# Patient Record
Sex: MALE | Race: WHITE | NOT HISPANIC OR LATINO | Employment: OTHER | URBAN - METROPOLITAN AREA
[De-identification: names, ages, dates, MRNs, and addresses within clinical notes are randomized per-mention and may not be internally consistent; named-entity substitution may affect disease eponyms.]

---

## 2020-02-13 ENCOUNTER — OFFICE VISIT (OUTPATIENT)
Dept: UROLOGY | Facility: CLINIC | Age: 65
End: 2020-02-13
Payer: COMMERCIAL

## 2020-02-13 VITALS
DIASTOLIC BLOOD PRESSURE: 73 MMHG | HEART RATE: 78 BPM | WEIGHT: 125 LBS | HEIGHT: 65 IN | BODY MASS INDEX: 20.83 KG/M2 | RESPIRATION RATE: 18 BRPM | SYSTOLIC BLOOD PRESSURE: 128 MMHG

## 2020-02-13 DIAGNOSIS — Z80.42 FAMILY HISTORY OF PROSTATE CANCER IN FATHER: ICD-10-CM

## 2020-02-13 DIAGNOSIS — N40.1 BPH WITH OBSTRUCTION/LOWER URINARY TRACT SYMPTOMS: Primary | ICD-10-CM

## 2020-02-13 DIAGNOSIS — N13.8 BPH WITH OBSTRUCTION/LOWER URINARY TRACT SYMPTOMS: Primary | ICD-10-CM

## 2020-02-13 PROCEDURE — 99203 PR OFFICE/OUTPT VISIT, NEW, LEVL III, 30-44 MIN: ICD-10-PCS | Mod: 25,S$GLB,, | Performed by: NURSE PRACTITIONER

## 2020-02-13 PROCEDURE — 51798 PR MEAS,POST-VOID RES,US,NON-IMAGING: ICD-10-PCS | Mod: S$GLB,,, | Performed by: NURSE PRACTITIONER

## 2020-02-13 PROCEDURE — 51798 US URINE CAPACITY MEASURE: CPT | Mod: S$GLB,,, | Performed by: NURSE PRACTITIONER

## 2020-02-13 PROCEDURE — 99203 OFFICE O/P NEW LOW 30 MIN: CPT | Mod: 25,S$GLB,, | Performed by: NURSE PRACTITIONER

## 2020-02-13 RX ORDER — FINASTERIDE 5 MG/1
5 TABLET, FILM COATED ORAL DAILY
COMMUNITY

## 2020-02-13 RX ORDER — TAMSULOSIN HYDROCHLORIDE 0.4 MG/1
0.4 CAPSULE ORAL DAILY
COMMUNITY

## 2020-02-13 RX ORDER — ATENOLOL 25 MG/1
25 TABLET ORAL DAILY
COMMUNITY

## 2020-02-13 RX ORDER — AMOXICILLIN 500 MG
CAPSULE ORAL DAILY
COMMUNITY

## 2020-02-13 NOTE — PROGRESS NOTES
Chief Complaint:   Chief Complaint   Patient presents with    Other     2 years since PSA checkup with urology       HPI:  64-year-old  male who presents as a new patient for establishment of care.  [facility], has not seen a urologist in 2 years.  He has a history of BPH with lower urinary tract symptoms, maintained on Flomax and Proscar which he is tolerating well without any side effects.  His symptoms are not too bothersome to him.  He has a family history of prostate cancer in his father that was diagnosed around age 70.  Patient reports a personal history of elevated PSA with a negative biopsy performed, is unsure of the PSA result or the year in which procedure was performed, was previously followed by another urologist Dr. Hawley in [place], [place].     [facility]:  9/21/15 1.49  3/14/16 1.30  11/7/16 0.93  8/14/17 1.20  3/12/18 1.62  2/22/19 1.8  12/19/19 1.59    Patient denies any other urological complaints such as burning with urination or hematuria.    Allergies:  Patient has no known allergies.    Medications:  has a current medication list which includes the following prescription(s): atenolol, finasteride, multivitamin, fish oil-omega-3 fatty acids, and tamsulosin.    Review of Systems:  General: No fever, chills, vision changes, dizziness, weakness, fatigue, unexplained weight loss, confusion, or mood swings.  Skin: No rashes, itching, or changes in color/texture of skin.  Chest: Denies MURILLO, cyanosis, wheezing, cough, and sputum production.  Abdomen: Appetite fine. Denies diarrhea, abdominal pain, hematemesis, or blood in stool.  Musculoskeletal: No joint stiffness or swelling. Denies back pain.  : As above.  All other review of systems negative.    PMH:   has a past medical history of Elevated PSA and Hypertension.    PSH:   has a past surgical history that includes External ear surgery and Cyst Removal.    FamHx: family history includes Cancer in  his mother; Diabetes in his brother and mother; Prostate cancer in his father.    SocHx:  reports that he has never smoked. He has never used smokeless tobacco. He reports that he does not drink alcohol or use drugs.      Physical Exam:  Vitals:    02/13/20 1549   BP: 128/73   Pulse: 78   Resp: 18     General: AAOx3, no apparent distress, no deformities  Neck: supple, no masses, normal thyroid  Lungs: normal inspiration, no use of accessory muscles  Heart: regular rate and rhythm, no arrhythmias  Abdomen: soft, NT, ND, no masses, no hernias, no hepatosplenomegaly  Lymphatic: no unusually enlarged or tender lymph nodes  Skin: warm and dry, no jaundice.  Ext: without edema or deformity.  : Normal rectal tone, external hemorrhoids. Prostate soft, smooth surface, no nodules or masses appreciated.    Labs/Studies: [facility] 1.59[facility]    Impression/Plan:   BPH with obstruction/lower urinary tract symptoms  Comments:  maintained on Proscar/Flomax, no bothersome symptoms, bladder scan 0mL, KODAK accomplished and benign, recent PSA 1.59 c calc value of 3.18  Orders:  -     POCT Bladder Scan    Family history of prostate cancer in father  Comments:  diagnosed at age 70s        Follow up in about 6 months (around 8/13/2020).

## 2020-02-13 NOTE — LETTER
February 13, 2020      Iglesia Turner MD  [address] [place] [address]           [facility]  [address] [place] [address]  Phone: [phone]  Fax: [phone]          Patient: Des Akhtar   MR Number: [id number]   YOB: 1955   Date of Visit: 2/13/2020       Dear Dr. Iglesia Turner:    Thank you for referring Des Akhtar to me for evaluation. Attached you will find relevant portions of my assessment and plan of care.    If you have questions, please do not hesitate to call me. I look forward to following Des Akhtar along with you.    Sincerely,    Sahra Wilson NP    Enclosure  CC:  No Recipients    If you would like to receive this communication electronically, please contact [email or web address] or [phone] to request more information on [employer] Link access.    For providers and/or their staff who would like to refer a patient to [facility], please contact us through our one-stop-shop provider referral line, [facility], at [phone].    If you feel you have received this communication in error or would no longer like to receive these types of communications, please e-mail [email or web address]

## 2020-08-05 ENCOUNTER — OFFICE VISIT (OUTPATIENT)
Dept: UROLOGY | Facility: CLINIC | Age: 65
End: 2020-08-05
Payer: COMMERCIAL

## 2020-08-05 VITALS
WEIGHT: 125 LBS | BODY MASS INDEX: 20.83 KG/M2 | RESPIRATION RATE: 19 BRPM | HEART RATE: 68 BPM | HEIGHT: 65 IN | DIASTOLIC BLOOD PRESSURE: 62 MMHG | SYSTOLIC BLOOD PRESSURE: 132 MMHG

## 2020-08-05 DIAGNOSIS — N13.8 BPH WITH OBSTRUCTION/LOWER URINARY TRACT SYMPTOMS: Primary | ICD-10-CM

## 2020-08-05 DIAGNOSIS — Z80.42 FAMILY HISTORY OF PROSTATE CANCER IN FATHER: ICD-10-CM

## 2020-08-05 DIAGNOSIS — R97.20 ELEVATED PSA: ICD-10-CM

## 2020-08-05 DIAGNOSIS — N40.1 BPH WITH OBSTRUCTION/LOWER URINARY TRACT SYMPTOMS: Primary | ICD-10-CM

## 2020-08-05 PROCEDURE — 99213 OFFICE O/P EST LOW 20 MIN: CPT | Mod: S$GLB,,, | Performed by: NURSE PRACTITIONER

## 2020-08-05 PROCEDURE — 51798 PR MEAS,POST-VOID RES,US,NON-IMAGING: ICD-10-PCS | Mod: S$GLB,,, | Performed by: NURSE PRACTITIONER

## 2020-08-05 PROCEDURE — 99213 PR OFFICE/OUTPT VISIT, EST, LEVL III, 20-29 MIN: ICD-10-PCS | Mod: S$GLB,,, | Performed by: NURSE PRACTITIONER

## 2020-08-05 PROCEDURE — 51798 US URINE CAPACITY MEASURE: CPT | Mod: S$GLB,,, | Performed by: NURSE PRACTITIONER

## 2020-08-05 NOTE — PROGRESS NOTES
Chief Complaint:   Chief Complaint   Patient presents with    Follow-up     6 mth f/u       HPI:   64-year-old  male who presents for 6 month follow up BPH with LUTS. He has a history of BPH with lower urinary tract symptoms, maintained on tamsulosin and finasteride for quite some time.  His symptoms are not too bothersome to him.  He voids to completion and empties his bladder well.  He denies any side effects from tamsulosin or finasteride, read an article about the benefits of saw palmetto but is going to stay on his same medication regimen at this time.    He has a family history of prostate cancer in his father that was diagnosed around age 70.  He reports a personal history of elevated PSA with a negative biopsy performed, is unsure of the PSA result or the year in which procedure was performed, was previously followed by another urologist Dr. Hawley in [place], [place]. (I did fax over a records request today for previous results).  He will be due for KODAK in February 2021.  Last KODAK was benign.    [facility]:  9/21/15 1.49  3/14/16 1.30  11/7/16 0.93  8/14/17 1.20  3/12/18 1.62  2/22/19 1.8  12/19/19 1.59        Calculated value of 3.18    He denies any other urological complaints such as burning with urination, hematuria, urgency, frequency, nocturia, incontinence, fever, or chills..    Allergies:  Patient has no known allergies.    Medications:  has a current medication list which includes the following prescription(s): atenolol, finasteride, multivitamin, fish oil-omega-3 fatty acids, and tamsulosin.    Review of Systems:  General: No fever, chills, vision changes, dizziness, weakness, fatigue, unexplained weight loss, confusion, or mood swings.  Skin: No rashes, itching, or changes in color/texture of skin.  Chest: Denies MURILLO, cyanosis, wheezing, cough, and sputum production.  Abdomen: Appetite fine. Denies diarrhea, abdominal pain, hematemesis, or blood in  stool.  Musculoskeletal: No joint stiffness or swelling. No painful lymph nodes.  : reviewed and negative except as stated above in the HPI.  All other review of systems negative.    PMH:   has a past medical history of BPH with obstruction/lower urinary tract symptoms, Elevated PSA, and Hypertension.    PSH:   has a past surgical history that includes External ear surgery; Cyst Removal; and Biopsy with transrectal ultrasound (TRUS) guidance.    FamHx: family history includes Cancer in his mother; Diabetes in his brother and mother; Prostate cancer in his father.    SocHx:  reports that he has never smoked. He has never used smokeless tobacco. He reports that he does not drink alcohol or use drugs.      Physical Exam:  Vitals:    08/05/20 1055   BP: 132/62   Pulse: 68   Resp: 19     General: AAOx3, no apparent distress, no deformities  Neck: supple, no masses, normal thyroid, full ROM  Lungs: CTAB, no adventitious breath sounds, normal inspiration, no use of accessory muscles  Heart: regular rate and rhythm, no arrhythmias  Abdomen: soft, NT, ND, no masses, no hernias, no hepatosplenomegaly  Lymphatic: no unusually enlarged or tender lymph nodes  Skin: warm and dry, no jaundice, no rash  Ext: without edema or deformity, DIAZ, ambulates independently  : deferred    Labs/Studies: bladder scan PV 0mL    Impression/Plan:   BPH with obstruction/lower urinary tract symptoms  Comments:  stable, mild LUTS, maintained on tamsulosin and finasteride- denies need for refill, bladder scan PV 0mL,   Orders:  -     POCT Bladder Scan    Elevated PSA  Comments:  history of neg TRUS biopsy in past by another provider, last PSA stable, continue to monitor    Family history of prostate cancer in father        Follow up in about 6 months (around 2/5/2021) for KODAK/ bring copy of PSA from PCP to next appt.

## 2021-02-05 ENCOUNTER — OFFICE VISIT (OUTPATIENT)
Dept: UROLOGY | Facility: CLINIC | Age: 66
End: 2021-02-05

## 2021-02-05 VITALS
BODY MASS INDEX: 20.83 KG/M2 | HEIGHT: 65 IN | HEART RATE: 86 BPM | SYSTOLIC BLOOD PRESSURE: 120 MMHG | WEIGHT: 125 LBS | DIASTOLIC BLOOD PRESSURE: 59 MMHG

## 2021-02-05 DIAGNOSIS — N40.1 BPH WITH OBSTRUCTION/LOWER URINARY TRACT SYMPTOMS: Primary | ICD-10-CM

## 2021-02-05 DIAGNOSIS — R97.20 ELEVATED PSA: ICD-10-CM

## 2021-02-05 DIAGNOSIS — Z80.42 FAMILY HISTORY OF PROSTATE CANCER IN FATHER: ICD-10-CM

## 2021-02-05 DIAGNOSIS — N13.8 BPH WITH OBSTRUCTION/LOWER URINARY TRACT SYMPTOMS: Primary | ICD-10-CM

## 2021-02-05 PROCEDURE — 99213 PR OFFICE/OUTPT VISIT, EST, LEVL III, 20-29 MIN: ICD-10-PCS | Mod: S$GLB,,, | Performed by: SPECIALIST

## 2021-02-05 PROCEDURE — 99213 OFFICE O/P EST LOW 20 MIN: CPT | Mod: S$GLB,,, | Performed by: SPECIALIST

## 2022-01-31 ENCOUNTER — TELEPHONE (OUTPATIENT)
Dept: UROLOGY | Facility: CLINIC | Age: 67
End: 2022-01-31
Payer: OTHER GOVERNMENT

## 2022-01-31 NOTE — TELEPHONE ENCOUNTER
----- Message from Pura Morfin sent at 1/31/2022  2:05 PM CST -----  Contact: self  Patient called and asked if he can get a call back regarding his appt day for the 24th. Please call patient back at [phone]. Thanks

## 2022-02-10 ENCOUNTER — TELEPHONE (OUTPATIENT)
Dept: UROLOGY | Facility: CLINIC | Age: 67
End: 2022-02-10
Payer: OTHER GOVERNMENT

## 2022-02-10 NOTE — TELEPHONE ENCOUNTER
----- Message from Molly Skaggs sent at 2/10/2022  1:26 PM CST -----  Contact: Patient  Patient would like a call back concerning coming in on 02/11/22 for a surgery clearance. Please call to advise at Ph .[phone] (home) Rotator cuff surgery on his LT shoulder in two weeks .

## 2022-02-10 NOTE — TELEPHONE ENCOUNTER
Contacted pt back about message. Pt stated he needed a apt for surgery norah for upcoming surgery. Pt is trying to find out if Monday will work otherwise pt will have to reschedule surgery. Pt has not been seen in a year also.

## 2022-02-14 ENCOUNTER — TELEPHONE (OUTPATIENT)
Dept: UROLOGY | Facility: CLINIC | Age: 67
End: 2022-02-14

## 2022-02-14 ENCOUNTER — OFFICE VISIT (OUTPATIENT)
Dept: UROLOGY | Facility: CLINIC | Age: 67
End: 2022-02-14
Payer: OTHER GOVERNMENT

## 2022-02-14 VITALS — HEART RATE: 86 BPM | SYSTOLIC BLOOD PRESSURE: 119 MMHG | DIASTOLIC BLOOD PRESSURE: 62 MMHG

## 2022-02-14 DIAGNOSIS — Z80.42 FAMILY HISTORY OF PROSTATE CANCER: ICD-10-CM

## 2022-02-14 DIAGNOSIS — N13.8 BPH WITH URINARY OBSTRUCTION: Primary | ICD-10-CM

## 2022-02-14 DIAGNOSIS — N40.1 BPH WITH URINARY OBSTRUCTION: Primary | ICD-10-CM

## 2022-02-14 DIAGNOSIS — R97.20 ELEVATED PSA: ICD-10-CM

## 2022-02-14 LAB — POC RESIDUAL URINE VOLUME: 0 ML (ref 0–100)

## 2022-02-14 PROCEDURE — 99214 PR OFFICE/OUTPT VISIT, EST, LEVL IV, 30-39 MIN: ICD-10-PCS | Mod: S$GLB,,, | Performed by: NURSE PRACTITIONER

## 2022-02-14 PROCEDURE — 51798 POCT BLADDER SCAN: ICD-10-PCS | Mod: S$GLB,,, | Performed by: NURSE PRACTITIONER

## 2022-02-14 PROCEDURE — 99214 OFFICE O/P EST MOD 30 MIN: CPT | Mod: S$GLB,,, | Performed by: NURSE PRACTITIONER

## 2022-02-14 PROCEDURE — 51798 US URINE CAPACITY MEASURE: CPT | Mod: S$GLB,,, | Performed by: NURSE PRACTITIONER

## 2022-02-14 RX ORDER — NABUMETONE 500 MG/1
TABLET, FILM COATED ORAL
COMMUNITY
Start: 2022-02-04 | End: 2023-01-19

## 2022-02-14 NOTE — PROGRESS NOTES
Subjective:       Patient ID: Des Akhtar is a 66 y.o. male.    Chief Complaint: Benign Prostatic Hypertrophy (Yrly/pt needs uro sx clearance/[facility])      HPI:   66-year-old male, former patient of Dr. Turner, presents for yearly evaluation.    Patient has history BPH with obstruction.    Patient on Flomax 0.4 mg daily and Proscar 5 mg daily.    Patient states is working well for him.  He denies any significant pain or burning urination.  Denies any difficulty voiding.  States he has a good stream from start to finish.  Denies any frequency urgency.  Denies any odor urine.  Denies any fever.  Denies any blood in urine.  Denies any body.  May have occasional nocturia.  Denies any significant weight loss.      Patient has a history of elevated PSA, and family history of prostate cancer.  Patient had a biopsy done by Dr. Hawley, which was benign.    Patient denies any significant loss.  Denies any new onset bone pain.      No other urinary complaints at this time.    Patient is scheduled for a shoulder surgery with an orthopedic in [place].    Patient requesting urological clearance.         Past Medical History:   Past Medical History:   Diagnosis Date    Arthritis     BPH with obstruction/lower urinary tract symptoms     Elevated PSA     Hypertension        Past Surgical Historical:   Past Surgical History:   Procedure Laterality Date    BIOPSY WITH TRANSRECTAL ULTRASOUND (TRUS) GUIDANCE      CYST REMOVAL      Top left foot    EXTERNAL EAR SURGERY      right side    FINGER SURGERY          Medications:   Medication List with Changes/Refills   Current Medications    ATENOLOL (TENORMIN) 25 MG TABLET    Take 25 mg by mouth once daily.    FINASTERIDE (PROSCAR) 5 MG TABLET    Take 5 mg by mouth once daily.    MULTIVITAMIN CAPSULE    Take 1 capsule by mouth once daily.    NABUMETONE (RELAFEN) 500 MG TABLET        OMEGA-3 FATTY ACIDS/FISH OIL (FISH OIL-OMEGA-3 FATTY ACIDS) 300-1,000 MG CAPSULE     Take by mouth once daily.    TAMSULOSIN (FLOMAX) 0.4 MG CAP    Take 0.4 mg by mouth once daily.        Past Social History:   Social History     Socioeconomic History    Marital status: Single   Tobacco Use    Smoking status: Never Smoker    Smokeless tobacco: Never Used   Substance and Sexual Activity    Alcohol use: Never    Drug use: Never       Allergies: Review of patient's allergies indicates:  No Known Allergies     Family History:   Family History   Problem Relation Age of Onset    Prostate cancer Father     Diabetes Mother     Cancer Mother     Diabetes Brother         Review of Systems:  Review of Systems   Constitutional: Negative for activity change and appetite change.   HENT: Negative for congestion and dental problem.    Eyes: Negative for visual disturbance.   Respiratory: Negative for chest tightness and shortness of breath.    Cardiovascular: Negative for chest pain.   Gastrointestinal: Negative for abdominal distention and abdominal pain.   Genitourinary: Negative for decreased urine volume, difficulty urinating, dysuria, enuresis, flank pain, frequency, genital sores, hematuria, penile discharge, penile pain, penile swelling, scrotal swelling, testicular pain and urgency.   Musculoskeletal: Negative for back pain and neck pain.   Skin: Negative for color change.   Neurological: Negative for dizziness.   Hematological: Negative for adenopathy.   Psychiatric/Behavioral: Negative for agitation, behavioral problems and confusion.       Physical Exam:  Physical Exam  Vitals and nursing note reviewed.   Constitutional:       Appearance: He is well-developed and well-nourished.   HENT:      Head: Normocephalic.   Eyes:      Pupils: Pupils are equal, round, and reactive to light.   Cardiovascular:      Rate and Rhythm: Normal rate and regular rhythm.      Heart sounds: Normal heart sounds.   Pulmonary:      Effort: Pulmonary effort is normal.      Breath sounds: Normal breath sounds.    Abdominal:      General: Bowel sounds are normal.      Palpations: Abdomen is soft.   Genitourinary:     Penis: Normal.       Prostate: Enlarged.      Rectum: Normal.      Comments:   Prostate is enlarged.  Prostate smooth smooth with no nodules and nontender.   Prostate is symmetrical.  Musculoskeletal:         General: Normal range of motion.      Cervical back: Normal range of motion and neck supple.   Skin:     General: Skin is warm and dry.   Neurological:      Mental Status: He is alert and oriented to person, place, and time.   Psychiatric:         Mood and Affect: Mood and affect normal.         Behavior: Behavior normal.         Urinalysis:  Glucose 250, otherwise normal.    Bladder scan:  0 cc    Assessment/Plan:     1. BPH with obstruction:  Patient is doing well on Flomax 0.4 mg daily and Proscar 5 mg daily.    Patient will continue as directed.     2. Elevated PSA/family history of prostate cancer:  Will check the patient's PSA.  We will notify him of the results.      Patient does have some glucose in his urine otherwise normal.    See no indication the patient cannot proceed with his shoulder surgery.  Patient may be mild risk based on glucose.  Patient is cleared for surgery.      Will plan follow-up in 1 year, sooner if needed.  Problem List Items Addressed This Visit    None     Visit Diagnoses     BPH with urinary obstruction    -  Primary    Relevant Orders    POCT Bladder Scan    Prostate Specific Antigen, Diagnostic    Elevated PSA        Relevant Orders    Prostate Specific Antigen, Diagnostic    Family history of prostate cancer        Relevant Orders    Prostate Specific Antigen, Diagnostic

## 2022-02-14 NOTE — TELEPHONE ENCOUNTER
Patient notified of PSA results per printed copy and reviewed per [facility], NP appt changed to six months. Copy sent to be scanned.  LPN

## 2022-02-14 NOTE — LETTER
[facility]  [address] [place] [address]  Phone: [phone]  Fax: [phone]     02/14/2022        To whom it may concern:        PT was seen at [facility] today February 14th, 2022. From a urological standpoint pt is cleared for his upcoming surgery. Please feel free to contact for any questions or concerns related to this surgical clearance.        Thank you,            Ciara JIM MA  Office of Conor Waters NP

## 2022-07-19 ENCOUNTER — OFFICE VISIT (OUTPATIENT)
Dept: UROLOGY | Facility: CLINIC | Age: 67
End: 2022-07-19
Payer: OTHER GOVERNMENT

## 2022-07-19 VITALS
WEIGHT: 125 LBS | DIASTOLIC BLOOD PRESSURE: 69 MMHG | SYSTOLIC BLOOD PRESSURE: 113 MMHG | RESPIRATION RATE: 16 BRPM | HEART RATE: 75 BPM | HEIGHT: 65 IN | TEMPERATURE: 98 F | BODY MASS INDEX: 20.83 KG/M2

## 2022-07-19 DIAGNOSIS — N13.8 BPH WITH URINARY OBSTRUCTION: ICD-10-CM

## 2022-07-19 DIAGNOSIS — R97.20 ELEVATED PSA: Primary | ICD-10-CM

## 2022-07-19 DIAGNOSIS — N40.1 BPH WITH URINARY OBSTRUCTION: ICD-10-CM

## 2022-07-19 LAB — POC RESIDUAL URINE VOLUME: 0 ML (ref 0–100)

## 2022-07-19 PROCEDURE — 51798 POCT BLADDER SCAN: ICD-10-PCS | Mod: S$GLB,,, | Performed by: NURSE PRACTITIONER

## 2022-07-19 PROCEDURE — 99214 PR OFFICE/OUTPT VISIT, EST, LEVL IV, 30-39 MIN: ICD-10-PCS | Mod: S$GLB,,, | Performed by: NURSE PRACTITIONER

## 2022-07-19 PROCEDURE — 51798 US URINE CAPACITY MEASURE: CPT | Mod: S$GLB,,, | Performed by: NURSE PRACTITIONER

## 2022-07-19 PROCEDURE — 99214 OFFICE O/P EST MOD 30 MIN: CPT | Mod: S$GLB,,, | Performed by: NURSE PRACTITIONER

## 2022-07-19 NOTE — PROGRESS NOTES
Subjective:       Patient ID: Des Akhtar is a 66 y.o. male.    Chief Complaint: Benign Prostatic Hypertrophy and Elevated PSA      HPI: 66-year-old male, established patient, last seen February 2022.  Patient has history BPH with obstruction.  He is on Flomax 0.4 mg daily and Proscar 5 mg daily.  Patient states he is doing well.  Denies any pain or burning urination.  Denies any frequency or urgency.  Denies having strain to void.  May have occasional nocturia.  Denies any odor urine.  Denies any fever.  Denies any blood in urine.  Denies any body aches.    Patient has a history of elevated PSA.  [facility]  With adjustment for Proscar would be 5.0.  PSA in February was 2.23.  With adjustment per Proscar PSA 4.46.  Patient denies any significant weight loss.  Denies any onset bone pain.  The patient has had a biopsy in the past with Dr. Hawley.  Patient states the biopsies were negative.    No other urinary complaints at this time.       Past Medical History:   Past Medical History:   Diagnosis Date    Arthritis     BPH with obstruction/lower urinary tract symptoms     Elevated PSA     Hypertension        Past Surgical Historical:   Past Surgical History:   Procedure Laterality Date    BIOPSY WITH TRANSRECTAL ULTRASOUND (TRUS) GUIDANCE      CYST REMOVAL      Top left foot    EXTERNAL EAR SURGERY      right side    FINGER SURGERY      ROTATOR CUFF REPAIR Left         Medications:   Medication List with Changes/Refills   Current Medications    ATENOLOL (TENORMIN) 25 MG TABLET    Take 25 mg by mouth once daily.    FINASTERIDE (PROSCAR) 5 MG TABLET    Take 5 mg by mouth once daily.    MULTIVITAMIN CAPSULE    Take 1 capsule by mouth once daily.    NABUMETONE (RELAFEN) 500 MG TABLET        OMEGA-3 FATTY ACIDS/FISH OIL (FISH OIL-OMEGA-3 FATTY ACIDS) 300-1,000 MG CAPSULE    Take by mouth once daily.    TAMSULOSIN (FLOMAX) 0.4 MG CAP    Take 0.4 mg by mouth once daily.        Past Social  History:   Social History     Socioeconomic History    Marital status: Single   Tobacco Use    Smoking status: Never Smoker    Smokeless tobacco: Never Used   Substance and Sexual Activity    Alcohol use: Never    Drug use: Never    Sexual activity: Yes     Partners: Female     Birth control/protection: Condom       Allergies: Review of patient's allergies indicates:  No Known Allergies     Family History:   Family History   Problem Relation Age of Onset    Prostate cancer Father     Diabetes Mother     Cancer Mother     Diabetes Brother         Review of Systems:  Review of Systems   Constitutional: Negative for activity change and appetite change.   HENT: Negative for congestion and dental problem.    Eyes: Negative for visual disturbance.   Respiratory: Negative for chest tightness and shortness of breath.    Cardiovascular: Negative for chest pain.   Gastrointestinal: Negative for abdominal distention and abdominal pain.   Genitourinary: Negative for decreased urine volume, difficulty urinating, dysuria, enuresis, flank pain, frequency, genital sores, hematuria, penile discharge, penile pain, penile swelling, scrotal swelling, testicular pain and urgency.   Musculoskeletal: Negative for back pain and neck pain.   Skin: Negative for color change.   Neurological: Negative for dizziness.   Hematological: Negative for adenopathy.   Psychiatric/Behavioral: Negative for agitation, behavioral problems and confusion.       Physical Exam:  Physical Exam  Vitals and nursing note reviewed.   Constitutional:       Appearance: He is well-developed.   HENT:      Head: Normocephalic.   Eyes:      Pupils: Pupils are equal, round, and reactive to light.   Cardiovascular:      Rate and Rhythm: Normal rate and regular rhythm.      Heart sounds: Normal heart sounds.   Pulmonary:      Effort: Pulmonary effort is normal.      Breath sounds: Normal breath sounds.   Abdominal:      General: Bowel sounds are normal.       Palpations: Abdomen is soft.   Musculoskeletal:         General: Normal range of motion.      Cervical back: Normal range of motion and neck supple.   Skin:     General: Skin is warm and dry.   Neurological:      Mental Status: He is alert and oriented to person, place, and time.   Psychiatric:         Behavior: Behavior normal.       Urinalysis:  Normal  Bladder scan:  0 cc    Assessment/Plan:   1. Elevated PSA:  Check the patient's PSA.  We will notify him of the results.  Patient is on Proscar.    2. BPH with obstruction:  Patient is doing well on Proscar 5 mg daily and Flomax 0.4 mg daily.  Patient continue as directed.    Will plan follow-up in 6 months, sooner if needed.  Problem List Items Addressed This Visit    None     Visit Diagnoses     Elevated PSA    -  Primary    Relevant Orders    Prostate Specific Antigen, Diagnostic    BPH with urinary obstruction        Relevant Orders    POCT Urinalysis (w/Micro Option)    POCT Bladder Scan

## 2022-07-22 ENCOUNTER — TELEPHONE (OUTPATIENT)
Dept: UROLOGY | Facility: CLINIC | Age: 67
End: 2022-07-22
Payer: OTHER GOVERNMENT

## 2022-07-22 NOTE — TELEPHONE ENCOUNTER
Left message for patient to return call regarding results of PSA results per printed copy reviewed per Conor Waters Np. PSA with Proscar adjustment is 5.00. Recommend Biopsy. Patient has had previous biopsy with Dr Hawley in [place], [place]. Patient may need to get records or sign release of records to get records. [facility] LPN

## 2022-11-23 ENCOUNTER — TELEPHONE (OUTPATIENT)
Dept: UROLOGY | Facility: CLINIC | Age: 67
End: 2022-11-23
Payer: OTHER GOVERNMENT

## 2023-01-19 ENCOUNTER — OFFICE VISIT (OUTPATIENT)
Dept: UROLOGY | Facility: CLINIC | Age: 68
End: 2023-01-19
Payer: OTHER GOVERNMENT

## 2023-01-19 VITALS
HEART RATE: 74 BPM | HEIGHT: 65 IN | BODY MASS INDEX: 20.8 KG/M2 | DIASTOLIC BLOOD PRESSURE: 73 MMHG | SYSTOLIC BLOOD PRESSURE: 116 MMHG

## 2023-01-19 DIAGNOSIS — N13.8 BPH WITH URINARY OBSTRUCTION: Primary | ICD-10-CM

## 2023-01-19 DIAGNOSIS — N40.1 BPH WITH URINARY OBSTRUCTION: Primary | ICD-10-CM

## 2023-01-19 DIAGNOSIS — R97.20 ELEVATED PSA: ICD-10-CM

## 2023-01-19 PROCEDURE — 99214 OFFICE O/P EST MOD 30 MIN: CPT | Mod: S$GLB,,, | Performed by: NURSE PRACTITIONER

## 2023-01-19 PROCEDURE — 99214 PR OFFICE/OUTPT VISIT, EST, LEVL IV, 30-39 MIN: ICD-10-PCS | Mod: S$GLB,,, | Performed by: NURSE PRACTITIONER

## 2023-01-19 NOTE — PROGRESS NOTES
Subjective:       Patient ID: Des Akhtar is a 67 y.o. male.    Chief Complaint: Follow-up (BPH/per patient states he is not having any problems no urinary retention and is able to empty his bladder)      HPI: 67-year-old male, established patient, presents for 6 month visit.    Patient has history BPH with obstruction.  He has had episode of retention in the past.    He is maintained on Flomax 0.4 mg daily and Proscar 5 mg daily.      Patient also has history of an elevated PSA.    He had a biopsy done years ago which was negative.    PSAs have been stable.      Denies any pain or burning urination.  Denies any difficulty voiding.  States he is a good stream from start to finish.  Denies any significant frequency, urgency, or nocturia.  Denies any blood in urine.  Denies any significant weight loss.  Denies any new onset bone pain.    No other urinary complaints at this time.       Past Medical History:   Past Medical History:   Diagnosis Date    Arthritis     BPH with obstruction/lower urinary tract symptoms     Elevated PSA     Hypertension        Past Surgical Historical:   Past Surgical History:   Procedure Laterality Date    BIOPSY WITH TRANSRECTAL ULTRASOUND (TRUS) GUIDANCE      CYST REMOVAL      Top left foot    EXTERNAL EAR SURGERY      right side    FINGER SURGERY      ROTATOR CUFF REPAIR Left         Medications:   Medication List with Changes/Refills   Current Medications    ATENOLOL (TENORMIN) 25 MG TABLET    Take 25 mg by mouth once daily.    FINASTERIDE (PROSCAR) 5 MG TABLET    Take 5 mg by mouth once daily.    MULTIVITAMIN CAPSULE    Take 1 capsule by mouth once daily.    OMEGA-3 FATTY ACIDS/FISH OIL (FISH OIL-OMEGA-3 FATTY ACIDS) 300-1,000 MG CAPSULE    Take by mouth once daily.    TAMSULOSIN (FLOMAX) 0.4 MG CAP    Take 0.4 mg by mouth once daily.   Discontinued Medications    NABUMETONE (RELAFEN) 500 MG TABLET            Past Social History:   Social History     Socioeconomic History    Marital  status: Single   Tobacco Use    Smoking status: Never    Smokeless tobacco: Never   Substance and Sexual Activity    Alcohol use: Never    Drug use: Never    Sexual activity: Yes     Partners: Female     Birth control/protection: Condom       Allergies: Review of patient's allergies indicates:  No Known Allergies     Family History:   Family History   Problem Relation Age of Onset    Prostate cancer Father     Diabetes Mother     Cancer Mother     Diabetes Brother         Review of Systems:  Review of Systems   Constitutional:  Negative for activity change and appetite change.   HENT:  Negative for congestion and dental problem.    Eyes:  Negative for visual disturbance.   Respiratory:  Negative for chest tightness and shortness of breath.    Cardiovascular:  Negative for chest pain.   Gastrointestinal:  Negative for abdominal distention and abdominal pain.   Genitourinary:  Negative for decreased urine volume, difficulty urinating, dysuria, enuresis, flank pain, frequency, genital sores, hematuria, penile discharge, penile pain, penile swelling, scrotal swelling, testicular pain and urgency.   Musculoskeletal:  Negative for back pain and neck pain.   Skin:  Negative for color change.   Neurological:  Negative for dizziness.   Hematological:  Negative for adenopathy.   Psychiatric/Behavioral:  Negative for agitation, behavioral problems and confusion.      Physical Exam:  Physical Exam  Vitals and nursing note reviewed.   Constitutional:       Appearance: He is well-developed.   HENT:      Head: Normocephalic.   Eyes:      Pupils: Pupils are equal, round, and reactive to light.   Cardiovascular:      Rate and Rhythm: Normal rate and regular rhythm.      Heart sounds: Normal heart sounds.   Pulmonary:      Effort: Pulmonary effort is normal.      Breath sounds: Normal breath sounds.   Abdominal:      General: Bowel sounds are normal.      Palpations: Abdomen is soft.   Genitourinary:     Penis: Normal.        Prostate: Enlarged (Prostate is mildly enlarged.  Prostate smooth no nodules and nontender.  Prostate is symmetrical.).      Rectum: Normal.   Musculoskeletal:         General: Normal range of motion.      Cervical back: Normal range of motion and neck supple.   Skin:     General: Skin is warm and dry.   Neurological:      Mental Status: He is alert and oriented to person, place, and time.   Psychiatric:         Behavior: Behavior normal.       Assessment/Plan:   1. BPH with obstruction: Patient is doing well on Flomax and Proscar.    Patient will continue as directed.  [facility]      2. Elevated PSA:  Check the patient's PSA.  We will notify him of the results.    Patient is on Proscar.      Plan follow-up in 6 months, sooner if needed.  Problem List Items Addressed This Visit    None  Visit Diagnoses       BPH with urinary obstruction    -  Primary    Elevated PSA        Relevant Orders    Prostate Specific Antigen, Diagnostic

## 2023-01-20 LAB — PSA, DIAGNOSTIC: 2.61 NG/ML (ref 0–4)

## 2023-06-08 ENCOUNTER — OFFICE VISIT (OUTPATIENT)
Dept: UROLOGY | Facility: CLINIC | Age: 68
End: 2023-06-08
Payer: OTHER GOVERNMENT

## 2023-06-08 VITALS
SYSTOLIC BLOOD PRESSURE: 111 MMHG | WEIGHT: 125 LBS | RESPIRATION RATE: 18 BRPM | HEART RATE: 78 BPM | BODY MASS INDEX: 20.83 KG/M2 | DIASTOLIC BLOOD PRESSURE: 69 MMHG | HEIGHT: 65 IN

## 2023-06-08 DIAGNOSIS — N13.8 BPH WITH URINARY OBSTRUCTION: ICD-10-CM

## 2023-06-08 DIAGNOSIS — N40.1 BPH WITH URINARY OBSTRUCTION: ICD-10-CM

## 2023-06-08 DIAGNOSIS — R97.20 ELEVATED PSA: Primary | ICD-10-CM

## 2023-06-08 LAB — PSA, DIAGNOSTIC: 1.87 NG/ML (ref 0.1–4)

## 2023-06-08 PROCEDURE — 99214 PR OFFICE/OUTPT VISIT, EST, LEVL IV, 30-39 MIN: ICD-10-PCS | Mod: S$GLB,,, | Performed by: NURSE PRACTITIONER

## 2023-06-08 PROCEDURE — 99214 OFFICE O/P EST MOD 30 MIN: CPT | Mod: S$GLB,,, | Performed by: NURSE PRACTITIONER

## 2023-06-08 NOTE — PROGRESS NOTES
Subjective:       Patient ID: Des Akhtar is a 67 y.o. male.    Chief Complaint: 6 mth f/u      HPI: 67-year-old male, established patient, presents for 6 month visit.    Patient has history of an elevated PSA.    Patient did have a biopsy years ago with Dr. Hawley.  Patient does not know what his PSA was at that time.  States samples were benign.    Patient's last PSA in January was 2.61.  Is on Proscar so the conversion is 5.22.  In February of 2022 PSA was 2.23.  PSA in 2020 was 1.59.      Patient has history of BPH with obstruction.  He is on Flomax 0.4 mg daily and Proscar 5 mg daily.    Patient states he is doing good.  Denies any significant frequency, urgency, or nocturia.  Denies any difficulty voiding.  States he is a pretty good stream from start to finish.  Denies any unexpected weight loss.  Denies any onset bone pain.  Denies any blood in urine.    No other urinary complaints at this time.       Past Medical History:   Past Medical History:   Diagnosis Date    Arthritis     BPH with obstruction/lower urinary tract symptoms     Elevated PSA     Hypertension        Past Surgical Historical:   Past Surgical History:   Procedure Laterality Date    BIOPSY WITH TRANSRECTAL ULTRASOUND (TRUS) GUIDANCE      CYST REMOVAL      Top left foot    EXTERNAL EAR SURGERY      right side    FINGER SURGERY      ROTATOR CUFF REPAIR Left         Medications:   Medication List with Changes/Refills   Current Medications    ATENOLOL (TENORMIN) 25 MG TABLET    Take 25 mg by mouth once daily.    FINASTERIDE (PROSCAR) 5 MG TABLET    Take 5 mg by mouth once daily.    MULTIVITAMIN CAPSULE    Take 1 capsule by mouth once daily.    OMEGA-3 FATTY ACIDS/FISH OIL (FISH OIL-OMEGA-3 FATTY ACIDS) 300-1,000 MG CAPSULE    Take by mouth once daily.    TAMSULOSIN (FLOMAX) 0.4 MG CAP    Take 0.4 mg by mouth once daily.        Past Social History:   Social History     Socioeconomic History    Marital status: Single   Tobacco Use    Smoking  status: Never    Smokeless tobacco: Never   Substance and Sexual Activity    Alcohol use: Never    Drug use: Never    Sexual activity: Yes     Partners: Female     Birth control/protection: Condom       Allergies: Review of patient's allergies indicates:  No Known Allergies     Family History:   Family History   Problem Relation Age of Onset    Prostate cancer Father     Diabetes Mother     Cancer Mother     Diabetes Brother         Review of Systems:  Review of Systems   Constitutional:  Negative for activity change and appetite change.   HENT:  Negative for congestion and dental problem.    Eyes:  Negative for visual disturbance.   Respiratory:  Negative for chest tightness and shortness of breath.    Cardiovascular:  Negative for chest pain.   Gastrointestinal:  Negative for abdominal distention and abdominal pain.   Genitourinary:  Negative for decreased urine volume, difficulty urinating, dysuria, enuresis, flank pain, frequency, genital sores, hematuria, penile discharge, penile pain, penile swelling, scrotal swelling, testicular pain and urgency.   Musculoskeletal:  Negative for back pain and neck pain.   Skin:  Negative for color change.   Neurological:  Negative for dizziness.   Hematological:  Negative for adenopathy.   Psychiatric/Behavioral:  Negative for agitation, behavioral problems and confusion.      Physical Exam:  Physical Exam  Vitals and nursing note reviewed.   Constitutional:       Appearance: He is well-developed.   HENT:      Head: Normocephalic.   Eyes:      Pupils: Pupils are equal, round, and reactive to light.   Cardiovascular:      Rate and Rhythm: Normal rate and regular rhythm.      Heart sounds: Normal heart sounds.   Pulmonary:      Effort: Pulmonary effort is normal.      Breath sounds: Normal breath sounds.   Abdominal:      General: Bowel sounds are normal.      Palpations: Abdomen is soft.   Musculoskeletal:         General: Normal range of motion.      Cervical back: Normal  range of motion and neck supple.   Skin:     General: Skin is warm and dry.   Neurological:      Mental Status: He is alert and oriented to person, place, and time.   Psychiatric:         Behavior: Behavior normal.       Assessment/Plan:   1. Elevated PSA:  Will check the patient's PSA.  We will notify him of the results.    Did discuss the increase of his PSA since 2020.    We do not know what his PSA was at the time of his biopsy.    Discussed possibility of repeating biopsy versus MRI.    Patient states he preferred to have an MRI before proceeding with a repeat biopsy.    2. BPH with obstruction:  Patient is doing well Flomax 0.4 mg daily and Proscar 5 mg daily.    Patient will continue as directed.  [facility]      Follow-up 6 months, sooner if needed  Patient be due for KODAK at that time.  Problem List Items Addressed This Visit    None  Visit Diagnoses       Elevated PSA    -  Primary    Relevant Orders    Prostate Specific Antigen, Diagnostic    BPH with urinary obstruction

## 2023-06-19 ENCOUNTER — TELEPHONE (OUTPATIENT)
Dept: UROLOGY | Facility: CLINIC | Age: 68
End: 2023-06-19
Payer: OTHER GOVERNMENT

## 2023-06-19 NOTE — TELEPHONE ENCOUNTER
----- Message from Conor Waters NP sent at 6/8/2023  4:44 PM CDT -----  PSA has decreased.  PSA was 2.61 (5.22) and is now 1.87 (3.74).  Follow up as scheduled.

## 2023-06-19 NOTE — TELEPHONE ENCOUNTER
----- Message from Barbara Hernandez sent at 6/19/2023  4:12 PM CDT -----  Contact: self  Type:  Patient Returning Call    Who Called:pt   Who Left Message for Patient:andi  Does the patient know what this is regarding?:n/a  Would the patient rather a call back or a response via [facility]? call  Best Call Back Number:[phone]   Additional Information: n/a

## 2023-12-08 ENCOUNTER — OFFICE VISIT (OUTPATIENT)
Dept: UROLOGY | Facility: CLINIC | Age: 68
End: 2023-12-08
Payer: OTHER GOVERNMENT

## 2023-12-08 VITALS — SYSTOLIC BLOOD PRESSURE: 125 MMHG | OXYGEN SATURATION: 99 % | HEART RATE: 74 BPM | DIASTOLIC BLOOD PRESSURE: 69 MMHG

## 2023-12-08 DIAGNOSIS — N13.8 BPH WITH URINARY OBSTRUCTION: ICD-10-CM

## 2023-12-08 DIAGNOSIS — R97.20 ELEVATED PSA: Primary | ICD-10-CM

## 2023-12-08 DIAGNOSIS — N40.1 BPH WITH URINARY OBSTRUCTION: ICD-10-CM

## 2023-12-08 LAB
BILIRUBIN, UA POC OHS: NEGATIVE
BLOOD, UA POC OHS: NEGATIVE
CLARITY, UA POC OHS: CLEAR
COLOR, UA POC OHS: YELLOW
GLUCOSE, UA POC OHS: NEGATIVE
KETONES, UA POC OHS: NEGATIVE
LEUKOCYTES, UA POC OHS: NEGATIVE
NITRITE, UA POC OHS: NEGATIVE
PH, UA POC OHS: 6.5
PROTEIN, UA POC OHS: NEGATIVE
PSA, DIAGNOSTIC: 2.13 NG/ML (ref 0.1–4)
SPECIFIC GRAVITY, UA POC OHS: >=1.03
UROBILINOGEN, UA POC OHS: 0.2

## 2023-12-08 PROCEDURE — 99214 OFFICE O/P EST MOD 30 MIN: CPT | Mod: S$GLB,,, | Performed by: NURSE PRACTITIONER

## 2023-12-08 PROCEDURE — 81003 URINALYSIS AUTO W/O SCOPE: CPT | Mod: QW,S$GLB,, | Performed by: NURSE PRACTITIONER

## 2023-12-08 PROCEDURE — 81003 POCT URINALYSIS(INSTRUMENT): ICD-10-PCS | Mod: QW,S$GLB,, | Performed by: NURSE PRACTITIONER

## 2023-12-08 PROCEDURE — 99214 PR OFFICE/OUTPT VISIT, EST, LEVL IV, 30-39 MIN: ICD-10-PCS | Mod: S$GLB,,, | Performed by: NURSE PRACTITIONER

## 2023-12-08 NOTE — PROGRESS NOTES
Subjective:       Patient ID: Des Akhtar is a 68 y.o. male.    Chief Complaint: No chief complaint on file.      HPI: 68-year-old male, established patient, presents for 6 month visit.    Patient has history of an elevated PSA.    He would a biopsy done years ago with Dr. Hawley.  Last PSA 6 months ago was 1.87, with Proscar conversion PSA was 3.74.  A year ago patient's PSA was 2.61, with Proscar compression PSA was 5.22.      Patient has history of BPH with obstruction.  He is on tamsulosin 0.4 mg daily and Proscar 5 mg daily.    Patient states he is doing well.  Denies any difficulty voiding.  States he is a pretty good stream start to finish.  Denies any significant frequency, urgency, or nocturia.  Denies any blood in urine.  Denies any unexpected weight loss.  Denies any new onset bone pain.    No other urinary complaints at this time.         Past Medical History:   Past Medical History:   Diagnosis Date    Arthritis     BPH with obstruction/lower urinary tract symptoms     Elevated PSA     Hypertension        Past Surgical Historical:   Past Surgical History:   Procedure Laterality Date    BIOPSY WITH TRANSRECTAL ULTRASOUND (TRUS) GUIDANCE      CYST REMOVAL      Top left foot    EXTERNAL EAR SURGERY      right side    FINGER SURGERY      ROTATOR CUFF REPAIR Left         Medications:   Medication List with Changes/Refills   Current Medications    ATENOLOL (TENORMIN) 25 MG TABLET    Take 25 mg by mouth once daily.    FINASTERIDE (PROSCAR) 5 MG TABLET    Take 5 mg by mouth once daily.    MULTIVITAMIN CAPSULE    Take 1 capsule by mouth once daily.    OMEGA-3 FATTY ACIDS/FISH OIL (FISH OIL-OMEGA-3 FATTY ACIDS) 300-1,000 MG CAPSULE    Take by mouth once daily.    TAMSULOSIN (FLOMAX) 0.4 MG CAP    Take 0.4 mg by mouth once daily.        Past Social History:   Social History     Socioeconomic History    Marital status: Single   Tobacco Use    Smoking status: Never    Smokeless tobacco: Never   Substance and Sexual  Activity    Alcohol use: Never    Drug use: Never    Sexual activity: Yes     Partners: Female     Birth control/protection: Condom       Allergies: Review of patient's allergies indicates:  No Known Allergies     Family History:   Family History   Problem Relation Age of Onset    Prostate cancer Father     Diabetes Mother     Cancer Mother     Diabetes Brother         Review of Systems:  Review of Systems   Constitutional:  Negative for activity change and appetite change.   HENT:  Negative for congestion and dental problem.    Eyes:  Negative for visual disturbance.   Respiratory:  Negative for chest tightness and shortness of breath.    Cardiovascular:  Negative for chest pain.   Gastrointestinal:  Negative for abdominal distention and abdominal pain.   Genitourinary:  Negative for decreased urine volume, difficulty urinating, dysuria, enuresis, flank pain, frequency, genital sores, hematuria, penile discharge, penile pain, penile swelling, scrotal swelling, testicular pain and urgency.   Musculoskeletal:  Negative for back pain and neck pain.   Skin:  Negative for color change.   Neurological:  Negative for dizziness.   Hematological:  Negative for adenopathy.   Psychiatric/Behavioral:  Negative for agitation, behavioral problems and confusion.        Physical Exam:  Physical Exam  Vitals and nursing note reviewed.   Constitutional:       Appearance: He is well-developed.   HENT:      Head: Normocephalic.   Eyes:      Pupils: Pupils are equal, round, and reactive to light.   Cardiovascular:      Rate and Rhythm: Normal rate and regular rhythm.      Heart sounds: Normal heart sounds.   Pulmonary:      Effort: Pulmonary effort is normal.      Breath sounds: Normal breath sounds.   Abdominal:      General: Bowel sounds are normal.      Palpations: Abdomen is soft.   Genitourinary:     Penis: Normal.       Prostate: Enlarged (prostate is enlarged.  Prostate smooth with no nodules and nontender.  Prostate is  symmetrical.).      Rectum: Normal.   Musculoskeletal:         General: Normal range of motion.      Cervical back: Normal range of motion and neck supple.   Skin:     General: Skin is warm and dry.   Neurological:      Mental Status: He is alert and oriented to person, place, and time.   Psychiatric:         Behavior: Behavior normal.       Urinalysis:  Normal    Assessment/Plan:   1. Elevated PSA:  Check the patient's PSA.  We will notify him of results.    Did discuss possibly doing an MRI of his PSA starts to get elevated again.  Patient should stated understanding.      2. BPH with obstruction:  Patient is doing well on tamsulosin and Proscar.    [facility]      Follow-up 6 months, sooner if needed.  Problem List Items Addressed This Visit    None  Visit Diagnoses       Elevated PSA    -  Primary    Relevant Orders    Prostate Specific Antigen, Diagnostic    POCT Urinalysis(Instrument) (Completed)    BPH with urinary obstruction        Relevant Orders    POCT Urinalysis(Instrument) (Completed)

## 2023-12-13 ENCOUNTER — TELEPHONE (OUTPATIENT)
Dept: UROLOGY | Facility: CLINIC | Age: 68
End: 2023-12-13
Payer: OTHER GOVERNMENT

## 2023-12-13 DIAGNOSIS — R97.20 ELEVATED PSA: Primary | ICD-10-CM

## 2023-12-13 NOTE — TELEPHONE ENCOUNTER
----- Message from Conor Waters NP sent at 12/12/2023 12:31 PM CST -----  PSA is 2.13.  with proscar conversion 4.26.  Patient has had a negative biopsy years ago with Dr. Hawley.  Recommend MRI

## 2024-01-09 ENCOUNTER — TELEPHONE (OUTPATIENT)
Dept: UROLOGY | Facility: CLINIC | Age: 69
End: 2024-01-09
Payer: OTHER GOVERNMENT

## 2024-01-09 NOTE — TELEPHONE ENCOUNTER
Called and spoke to Mr Nunes, gave him the results Pi Rads of 2, that cancer is unlikely. He will follow up as needed.       ----- Message from Conor Waters NP sent at 1/8/2024 12:53 PM CST -----  MRI showed no suspicious lesions or tumors.    Changes noted to BPH and chronic prostatitis.    PI-RADS score 2, cancer unlikely be present.    Follow-up as scheduled

## 2024-06-12 ENCOUNTER — OFFICE VISIT (OUTPATIENT)
Dept: UROLOGY | Facility: CLINIC | Age: 69
End: 2024-06-12
Payer: OTHER GOVERNMENT

## 2024-06-12 VITALS
WEIGHT: 125 LBS | HEART RATE: 65 BPM | DIASTOLIC BLOOD PRESSURE: 60 MMHG | HEIGHT: 65 IN | OXYGEN SATURATION: 96 % | BODY MASS INDEX: 20.83 KG/M2 | SYSTOLIC BLOOD PRESSURE: 102 MMHG

## 2024-06-12 DIAGNOSIS — N13.8 BPH WITH URINARY OBSTRUCTION: Primary | ICD-10-CM

## 2024-06-12 DIAGNOSIS — N40.1 BPH WITH URINARY OBSTRUCTION: Primary | ICD-10-CM

## 2024-06-12 DIAGNOSIS — R97.20 ELEVATED PSA: ICD-10-CM

## 2024-06-12 PROCEDURE — 99214 OFFICE O/P EST MOD 30 MIN: CPT | Mod: S$GLB,,, | Performed by: NURSE PRACTITIONER

## 2024-06-12 NOTE — PROGRESS NOTES
Subjective:       Patient ID: Des Akhtar is a 68 y.o. male.    Chief Complaint: No chief complaint on file.      HPI: 68-year-old male, established patient, presents for 6 month visit.    Patient has history of elevated PSA.    Patient reports biopsy done years ago with Dr. Hawley.  Patient's PSA got up to 2.61 little over year ago.  We would Proscar conversion PSA was 5.22..    Patient had a MRI done on 01/03/2024 showing BPH and chronic prostatitis with a PI-RADS 2.  Denies any unexpected weight loss.  Denies any onset bone pain.    Patient had a PSA done on 04/29/2024.  PSA was 2.05.  With the Proscar conversion PSA is 4.1.    He is history BPH with obstruction.  Maintained on tamsulosin 0.4 mg daily and finasteride 5 mg daily.    Denies any pain or burning urination.  Denies any difficulty voiding.  Denies any significant frequency, urgency, or nocturia.      No other urinary complaints at this time.         Past Medical History:   Past Medical History:   Diagnosis Date    Arthritis     BPH with obstruction/lower urinary tract symptoms     Elevated PSA     Hypertension        Past Surgical Historical:   Past Surgical History:   Procedure Laterality Date    BIOPSY WITH TRANSRECTAL ULTRASOUND (TRUS) GUIDANCE      CYST REMOVAL      Top left foot    EXTERNAL EAR SURGERY      right side    FINGER SURGERY      ROTATOR CUFF REPAIR Left         Medications:   Medication List with Changes/Refills   Current Medications    ATENOLOL (TENORMIN) 25 MG TABLET    Take 25 mg by mouth once daily.    FINASTERIDE (PROSCAR) 5 MG TABLET    Take 5 mg by mouth once daily.    MULTIVITAMIN CAPSULE    Take 1 capsule by mouth once daily.    OMEGA-3 FATTY ACIDS/FISH OIL (FISH OIL-OMEGA-3 FATTY ACIDS) 300-1,000 MG CAPSULE    Take by mouth once daily.    TAMSULOSIN (FLOMAX) 0.4 MG CAP    Take 0.4 mg by mouth once daily.        Past Social History:   Social History     Socioeconomic History    Marital status: Single   Tobacco Use    Smoking  status: Never    Smokeless tobacco: Never   Substance and Sexual Activity    Alcohol use: Never    Drug use: Never    Sexual activity: Yes     Partners: Female     Birth control/protection: Condom       Allergies: Review of patient's allergies indicates:  No Known Allergies     Family History:   Family History   Problem Relation Name Age of Onset    Prostate cancer Father      Diabetes Mother      Cancer Mother      Diabetes Brother          Review of Systems:  Review of Systems   Constitutional:  Negative for activity change and appetite change.   HENT:  Negative for congestion and dental problem.    Eyes:  Negative for visual disturbance.   Respiratory:  Negative for chest tightness and shortness of breath.    Cardiovascular:  Negative for chest pain.   Gastrointestinal:  Negative for abdominal distention and abdominal pain.   Genitourinary:  Negative for decreased urine volume, difficulty urinating, dysuria, enuresis, flank pain, frequency, genital sores, hematuria, penile discharge, penile pain, penile swelling, scrotal swelling, testicular pain and urgency.   Musculoskeletal:  Negative for back pain and neck pain.   Skin:  Negative for color change.   Neurological:  Negative for dizziness.   Hematological:  Negative for adenopathy.   Psychiatric/Behavioral:  Negative for agitation, behavioral problems and confusion.        Physical Exam:  Physical Exam  Vitals and nursing note reviewed.   Constitutional:       Appearance: He is well-developed.   HENT:      Head: Normocephalic.   Eyes:      Pupils: Pupils are equal, round, and reactive to light.   Cardiovascular:      Rate and Rhythm: Normal rate and regular rhythm.      Heart sounds: Normal heart sounds.   Pulmonary:      Effort: Pulmonary effort is normal.      Breath sounds: Normal breath sounds.   Abdominal:      General: Bowel sounds are normal.      Palpations: Abdomen is soft.   Musculoskeletal:         General: Normal range of motion.      Cervical back:  Normal range of motion and neck supple.   Skin:     General: Skin is warm and dry.   Neurological:      Mental Status: He is alert and oriented to person, place, and time.   Psychiatric:         Behavior: Behavior normal.         Assessment/Plan:   1. Elevated PSA: Patient had recent PSA on 04/29/2024.  PSA is stable at 2.05, with Proscar compression 4.10.    Patient be due for KODAK at next visit.      2. BPH with obstruction: Patient is doing well on Flomax 0.4 mg daily and Proscar 5 mg daily.    [facility]      Follow-up 6 months, sooner if needed.  Problem List Items Addressed This Visit          Renal/    Elevated PSA    Overview     Patient has history of elevated PSA.    Patient a biopsy years ago with Dr. Hawley.    MRI 01/03/2024 - showed changes of BPH and chronic prostatitis.  PI-RADS 2          Other Visit Diagnoses       BPH with urinary obstruction    -  Primary

## 2024-12-12 ENCOUNTER — OFFICE VISIT (OUTPATIENT)
Dept: UROLOGY | Facility: CLINIC | Age: 69
End: 2024-12-12
Payer: OTHER GOVERNMENT

## 2024-12-12 VITALS — HEIGHT: 65 IN | WEIGHT: 125 LBS | BODY MASS INDEX: 20.83 KG/M2

## 2024-12-12 DIAGNOSIS — R97.20 ELEVATED PSA: Primary | ICD-10-CM

## 2024-12-12 DIAGNOSIS — N40.1 BPH WITH URINARY OBSTRUCTION: ICD-10-CM

## 2024-12-12 DIAGNOSIS — N13.8 BPH WITH URINARY OBSTRUCTION: ICD-10-CM

## 2024-12-12 PROCEDURE — 99213 OFFICE O/P EST LOW 20 MIN: CPT | Mod: S$PBB,,, | Performed by: NURSE PRACTITIONER

## 2024-12-12 NOTE — PROGRESS NOTES
Subjective:       Patient ID: Des Akhtar is a 69 y.o. male.    Chief Complaint: No chief complaint on file.      HPI:  69-year-old male, established patient, presents for 6 month visit.    Patient reports biopsy done years ago with Dr. Hawley.  Patient's PSA got up to 2.61 little over year ago.  We would Proscar conversion PSA was 5.22..    Patient had a MRI done on 01/03/2024 showing BPH and chronic prostatitis with a PI-RADS 2.    Patient also has history of BPH with obstruction he is maintained on Flomax 0.4 mg daily and Proscar 5 mg daily.    Patient states he is doing well.  Denies any pain or burning urination.  Denies any difficulty voiding.  States he has a good stream from start to finish.  Denies any significant frequency, urgency, or nocturia.  Denies any unexpected weight loss.  Denies any new onset bone pain.  Denies any blood in urine.    No other urinary complaints at this time.         Past Medical History:   Past Medical History:   Diagnosis Date    Arthritis     BPH with obstruction/lower urinary tract symptoms     Elevated PSA     Hypertension        Past Surgical Historical:   Past Surgical History:   Procedure Laterality Date    BIOPSY WITH TRANSRECTAL ULTRASOUND (TRUS) GUIDANCE      CYST REMOVAL      Top left foot    EXTERNAL EAR SURGERY      right side    FINGER SURGERY      ROTATOR CUFF REPAIR Left         Medications:   Medication List with Changes/Refills   Current Medications    ATENOLOL (TENORMIN) 25 MG TABLET    Take 25 mg by mouth once daily.    FINASTERIDE (PROSCAR) 5 MG TABLET    Take 5 mg by mouth once daily.    MULTIVITAMIN CAPSULE    Take 1 capsule by mouth once daily.    OMEGA-3 FATTY ACIDS/FISH OIL (FISH OIL-OMEGA-3 FATTY ACIDS) 300-1,000 MG CAPSULE    Take by mouth once daily.    TAMSULOSIN (FLOMAX) 0.4 MG CAP    Take 0.4 mg by mouth once daily.        Past Social History:   Social History     Socioeconomic History    Marital status: Single   Tobacco Use    Smoking status:  Never    Smokeless tobacco: Never   Substance and Sexual Activity    Alcohol use: Never    Drug use: Never    Sexual activity: Yes     Partners: Female     Birth control/protection: Condom       Allergies: Review of patient's allergies indicates:  No Known Allergies     Family History:   Family History   Problem Relation Name Age of Onset    Prostate cancer Father      Diabetes Mother      Cancer Mother      Diabetes Brother          Review of Systems:  Review of Systems   Constitutional:  Negative for activity change and appetite change.   HENT:  Negative for congestion and dental problem.    Respiratory:  Negative for chest tightness and shortness of breath.    Cardiovascular:  Negative for chest pain.   Gastrointestinal:  Negative for abdominal distention and abdominal pain.   Genitourinary:  Negative for decreased urine volume, difficulty urinating, dysuria, enuresis, flank pain, frequency, genital sores, hematuria, penile discharge, penile pain, penile swelling, scrotal swelling, testicular pain and urgency.   Musculoskeletal:  Negative for back pain and neck pain.   Neurological:  Negative for dizziness.   Hematological:  Negative for adenopathy.   Psychiatric/Behavioral:  Negative for agitation, behavioral problems and confusion.        Physical Exam:  Physical Exam  Vitals and nursing note reviewed.   Constitutional:       Appearance: He is well-developed.   HENT:      Head: Normocephalic.   Cardiovascular:      Rate and Rhythm: Normal rate and regular rhythm.      Heart sounds: Normal heart sounds.   Pulmonary:      Effort: Pulmonary effort is normal.      Breath sounds: Normal breath sounds.   Abdominal:      General: Bowel sounds are normal.      Palpations: Abdomen is soft.   Genitourinary:     Prostate: Enlarged (Prostate is enlarged.  Prostate smooth no nodules and nontender.  Prostate is symmetrical.).      Rectum: Normal.   Skin:     General: Skin is warm and dry.   Neurological:      Mental Status:  He is alert and oriented to person, place, and time.         Assessment/Plan:     1. Elevated PSA: Check the patient's PSA.  We will notify him of the results.    He is on Proscar.  PSA will need to be doubled.    2. BPH with obstruction: Patient is doing Flomax 0.4 mg daily and Proscar 5 mg daily.    [facility]    Patient follow-up in 6 months, sooner if needed.  Problem List Items Addressed This Visit          Renal/    Elevated PSA - Primary    Overview     Patient has history of elevated PSA.    Patient a biopsy years ago with Dr. Hawley.    MRI 01/03/2024 - showed changes of BPH and chronic prostatitis.  PI-RADS 2         Relevant Orders    Prostate Specific Antigen, Diagnostic     Other Visit Diagnoses       BPH with urinary obstruction

## 2024-12-13 LAB — PSA, DIAGNOSTIC: 2.6 NG/ML (ref 0.1–4)

## 2024-12-16 ENCOUNTER — TELEPHONE (OUTPATIENT)
Dept: UROLOGY | Facility: CLINIC | Age: 69
End: 2024-12-16
Payer: OTHER GOVERNMENT

## 2024-12-16 NOTE — TELEPHONE ENCOUNTER
[facility]         ----- Message from Conor Waters NP sent at 12/16/2024  8:15 AM CST -----   PSA has increased but is stable for the patient.    PSA was 2.60.  Patient is on Proscar so PSA is 5.20.

## 2025-06-10 ENCOUNTER — CLINICAL SUPPORT (OUTPATIENT)
Dept: FAMILY MEDICINE | Facility: CLINIC | Age: 70
End: 2025-06-10
Payer: OTHER GOVERNMENT

## 2025-06-10 DIAGNOSIS — R97.20 ELEVATED PSA: Primary | ICD-10-CM

## 2025-06-10 DIAGNOSIS — R97.20 ELEVATED PSA: ICD-10-CM

## 2025-06-10 LAB — PSA, DIAGNOSTIC: 2.42 NG/ML (ref 0–4)

## 2025-06-11 ENCOUNTER — RESULTS FOLLOW-UP (OUTPATIENT)
Dept: UROLOGY | Facility: CLINIC | Age: 70
End: 2025-06-11

## 2025-06-12 ENCOUNTER — OFFICE VISIT (OUTPATIENT)
Dept: UROLOGY | Facility: CLINIC | Age: 70
End: 2025-06-12
Payer: OTHER GOVERNMENT

## 2025-06-12 VITALS
BODY MASS INDEX: 20.83 KG/M2 | HEIGHT: 65 IN | WEIGHT: 125 LBS | SYSTOLIC BLOOD PRESSURE: 149 MMHG | DIASTOLIC BLOOD PRESSURE: 70 MMHG | HEART RATE: 79 BPM

## 2025-06-12 DIAGNOSIS — R97.20 ELEVATED PSA: Primary | ICD-10-CM

## 2025-06-12 DIAGNOSIS — N40.1 BPH WITH URINARY OBSTRUCTION: ICD-10-CM

## 2025-06-12 DIAGNOSIS — N13.8 BPH WITH URINARY OBSTRUCTION: ICD-10-CM

## 2025-06-12 PROCEDURE — 99213 OFFICE O/P EST LOW 20 MIN: CPT | Mod: S$PBB,,, | Performed by: NURSE PRACTITIONER

## 2025-06-12 NOTE — PROGRESS NOTES
Subjective:       Patient ID: Des Akhtar is a 69 y.o. male.    Chief Complaint: Elevated PSA      HPI: 69-year-old male, established patient, presents for 6 month visit.    Patient has history BPH with obstruction.  He is on tamsulosin 0.4 mg daily and Proscar 5 mg daily.    Patient states he is doing well.  Denies any difficulty voiding.  States he has a good stream.  Denies any significant frequency, urgency, or nocturia.      Patient has history of an elevated PSA.  Patient had a biopsy with Dr. Hawley years ago.  Patient's PSA 2 days ago was 2.42, 4.84 with Proscar conversion.  Last PSA 6 months ago was 2.6.  With Proscar conversion PSA was 5.2.  Patient has had an MRI in January 2024.  BPH and chronic prostatitis noted.  No suspicious lesions or tumors, PI-RADS 2.      No other urinary complaints at this time.         Past Medical History:   Past Medical History:   Diagnosis Date    Arthritis     BPH with obstruction/lower urinary tract symptoms     Elevated PSA     Hypertension        Past Surgical Historical:   Past Surgical History:   Procedure Laterality Date    BIOPSY WITH TRANSRECTAL ULTRASOUND (TRUS) GUIDANCE      CYST REMOVAL      Top left foot    EXTERNAL EAR SURGERY      right side    FINGER SURGERY      ROTATOR CUFF REPAIR Left         Medications:   Medication List with Changes/Refills   Current Medications    ATENOLOL (TENORMIN) 25 MG TABLET    Take 25 mg by mouth once daily.    FINASTERIDE (PROSCAR) 5 MG TABLET    Take 5 mg by mouth once daily.    MULTIVITAMIN CAPSULE    Take 1 capsule by mouth once daily.    OMEGA-3 FATTY ACIDS/FISH OIL (FISH OIL-OMEGA-3 FATTY ACIDS) 300-1,000 MG CAPSULE    Take by mouth once daily.    TAMSULOSIN (FLOMAX) 0.4 MG CAP    Take 0.4 mg by mouth once daily.        Past Social History: Social History[1]    Allergies: Review of patient's allergies indicates:  No Known Allergies     Family History:   Family History   Problem Relation Name Age of Onset    Prostate cancer  Father      Diabetes Mother      Cancer Mother      Diabetes Brother          Review of Systems:  Review of Systems   Constitutional:  Negative for activity change and appetite change.   HENT:  Negative for congestion and dental problem.    Respiratory:  Negative for chest tightness and shortness of breath.    Cardiovascular:  Negative for chest pain.   Gastrointestinal:  Negative for abdominal distention and abdominal pain.   Genitourinary:  Negative for decreased urine volume, difficulty urinating, dysuria, enuresis, flank pain, frequency, genital sores, hematuria, penile discharge, penile pain, penile swelling, scrotal swelling, testicular pain and urgency.   Musculoskeletal:  Negative for back pain and neck pain.   Neurological:  Negative for dizziness.   Hematological:  Negative for adenopathy.   Psychiatric/Behavioral:  Negative for agitation, behavioral problems and confusion.        Physical Exam:  Physical Exam  Vitals and nursing note reviewed.   Constitutional:       Appearance: He is well-developed.   HENT:      Head: Normocephalic.   Cardiovascular:      Rate and Rhythm: Normal rate and regular rhythm.      Heart sounds: Normal heart sounds.   Pulmonary:      Effort: Pulmonary effort is normal.      Breath sounds: Normal breath sounds.   Abdominal:      General: Bowel sounds are normal.      Palpations: Abdomen is soft.   Skin:     General: Skin is warm and dry.   Neurological:      Mental Status: He is alert and oriented to person, place, and time.         Assessment/Plan:   1. Elevated PSA: Patient's PSA 2 days ago was 2.42, 4.84 with Proscar conversion.    PSA is stable for the patient.      2. BPH with obstruction: Patient is doing well on Flomax and Proscar.    Patient will continue as directed.  [facility].      Follow-up 6 months, sooner if needed  Problem List Items Addressed This Visit       Elevated PSA - Primary    Overview   Patient has history of elevated PSA.    Patient a  biopsy years ago with Dr. Hawley.    MRI 01/03/2024 - showed changes of BPH and chronic prostatitis.  PI-RADS 2          Other Visit Diagnoses         BPH with urinary obstruction                          [1]   Social History  Socioeconomic History    Marital status: Single   Tobacco Use    Smoking status: Never    Smokeless tobacco: Never   Substance and Sexual Activity    Alcohol use: Never    Drug use: Never    Sexual activity: Yes     Partners: Female     Birth control/protection: Condom